# Patient Record
Sex: FEMALE | Race: ASIAN | Employment: UNEMPLOYED | URBAN - METROPOLITAN AREA
[De-identification: names, ages, dates, MRNs, and addresses within clinical notes are randomized per-mention and may not be internally consistent; named-entity substitution may affect disease eponyms.]

---

## 2019-01-01 ENCOUNTER — OFFICE VISIT (OUTPATIENT)
Dept: FAMILY MEDICINE CLINIC | Facility: CLINIC | Age: 29
End: 2019-01-01

## 2019-01-01 VITALS
HEIGHT: 66 IN | TEMPERATURE: 98 F | SYSTOLIC BLOOD PRESSURE: 110 MMHG | HEART RATE: 100 BPM | OXYGEN SATURATION: 99 % | DIASTOLIC BLOOD PRESSURE: 80 MMHG | WEIGHT: 117 LBS | RESPIRATION RATE: 16 BRPM | BODY MASS INDEX: 18.8 KG/M2

## 2019-01-01 DIAGNOSIS — R68.89 FLU-LIKE SYMPTOMS: Primary | ICD-10-CM

## 2019-01-01 PROCEDURE — 99203 OFFICE O/P NEW LOW 30 MIN: CPT | Performed by: NURSE PRACTITIONER

## 2019-01-01 RX ORDER — OSELTAMIVIR PHOSPHATE 75 MG/1
75 CAPSULE ORAL 2 TIMES DAILY
Qty: 10 CAPSULE | Refills: 0 | Status: SHIPPED | OUTPATIENT
Start: 2019-01-01

## 2019-01-01 NOTE — PROGRESS NOTES
CHIEF COMPLAINT:   Patient presents with:  Sinus Problem    HPI:   Kalie Reid is a 25year old female who presents with upper respiratory symptoms for  2  days.  Patient reports congestion, fever with Tmax to 103 F last night, dry cough, fatigue, malaise, margarita NOSE: Nasal passages erythematous and swollen with clear drainage  THROAT: oral mucosa pink and moist; posterior pharynx pink; tonsils 0+; no exudate   NECK: supple, non-tender  LUNGS: clear to auscultation bilaterally, no wheezes or rhonchi.  Breathing is Follow these guidelines when caring for yourself at home:  · Avoid being around cigarette smoke, whether yours or other people’s. · Acetaminophen or ibuprofen will help ease your fever, muscle aches, and headache.  Don’t give aspirin to anyone younger than © 7399-9002 [employer] [address] [facility] [place], [facility] [place]. All rights reserved. This information is not intended as a substitute for professional medical care. Always follow your healthcare professional's instructions.         Fred Maddox · Stay home until your fever has been gone for at least 24 hours without using medicine to reduce fever. Follow-up care  Follow up with your healthcare provider, or as advised, if you are not getting better over the next week.   If you are age 72 or older,

## 2019-01-01 NOTE — PATIENT INSTRUCTIONS
Influenza (Adult)    Influenza is also called the flu. It is a viral illness that affects the air passages of your lungs. It is different from the common cold. The flu can easily be passed from one to person to another.  It may be spread through the air b If you are age 72 or older, talk with your provider about getting a pneumococcal vaccine every 5 years. You should also get this vaccine if you have chronic asthma or COPD. All adults should get a flu vaccine every fall. Ask your provider about this.   When Follow these guidelines when caring for yourself at home:  · Avoid being around cigarette smoke, whether yours or other people’s. · Acetaminophen or ibuprofen will help ease your fever, muscle aches, and headache.  Don’t give aspirin to anyone younger than © 6496-3278 [employer] [address]. [address] [facility] [place], [facility] [place]. All rights reserved. This information is not intended as a substitute for professional medical care. Always follow your healthcare professional's instructions.

## 2019-09-01 ENCOUNTER — HOSPITAL (OUTPATIENT)
Dept: OTHER | Age: 29
End: 2019-09-01

## 2019-09-01 LAB
ALBUMIN SERPL-MCNC: 3.3 G/DL (ref 3.6–5.1)
ALBUMIN/GLOB SERPL: 0.9 {RATIO} (ref 1–2.4)
ALP SERPL-CCNC: 42 UNITS/L (ref 45–117)
ALP SERPL-CCNC: ABNORMAL U/L
ALT SERPL-CCNC: 27 UNITS/L
ANALYZER ANC (IANC): ABNORMAL
ANION GAP SERPL CALC-SCNC: 12 MMOL/L (ref 10–20)
AST SERPL-CCNC: 22 UNITS/L
BASOPHILS # BLD: 0 K/MCL (ref 0–0.3)
BASOPHILS NFR BLD: 0 %
BILIRUB SERPL-MCNC: 0.5 MG/DL (ref 0.2–1)
BUN SERPL-MCNC: 9 MG/DL (ref 6–20)
BUN/CREAT SERPL: 16 (ref 7–25)
CALCIUM SERPL-MCNC: 8.5 MG/DL (ref 8.4–10.2)
CHLORIDE SERPL-SCNC: 106 MMOL/L (ref 98–107)
CO2 SERPL-SCNC: 23 MMOL/L (ref 21–32)
CREAT SERPL-MCNC: 0.56 MG/DL (ref 0.51–0.95)
DIFFERENTIAL METHOD BLD: ABNORMAL
EOSINOPHIL # BLD: 0.1 K/MCL (ref 0.1–0.5)
EOSINOPHIL NFR BLD: 1 %
ERYTHROCYTE [DISTWIDTH] IN BLOOD: 12.9 % (ref 11–15)
GLOBULIN SER-MCNC: 3.8 G/DL (ref 2–4)
GLUCOSE BLDC GLUCOMTR-MCNC: 100 MG/DL (ref 65–99)
GLUCOSE SERPL-MCNC: 97 MG/DL (ref 65–99)
HCT VFR BLD CALC: 35 % (ref 36–46.5)
HGB BLD-MCNC: 12 G/DL (ref 12–15.5)
IMM GRANULOCYTES # BLD AUTO: 0 K/MCL (ref 0–0.2)
IMM GRANULOCYTES NFR BLD: 0 %
LIPASE SERPL-CCNC: 63 UNITS/L (ref 73–393)
LYMPHOCYTES # BLD: 0.7 K/MCL (ref 1–4.8)
LYMPHOCYTES NFR BLD: 10 %
MAGNESIUM SERPL-MCNC: 1.9 MG/DL (ref 1.7–2.4)
MCH RBC QN AUTO: 31 PG (ref 26–34)
MCHC RBC AUTO-ENTMCNC: 34.3 G/DL (ref 32–36.5)
MCV RBC AUTO: 90.4 FL (ref 78–100)
MONOCYTES # BLD: 0.4 K/MCL (ref 0.3–0.9)
MONOCYTES NFR BLD: 6 %
NEUTROPHILS # BLD: 6 K/MCL (ref 1.8–7.7)
NEUTROPHILS NFR BLD: 83 %
NEUTS SEG NFR BLD: ABNORMAL %
NRBC (NRBCRE): 0 /100 WBC
PLATELET # BLD: 234 K/MCL (ref 140–450)
POTASSIUM SERPL-SCNC: 3.5 MMOL/L (ref 3.4–5.1)
PROT SERPL-MCNC: 7.1 G/DL (ref 6.4–8.2)
RBC # BLD: 3.87 MIL/MCL (ref 4–5.2)
SODIUM SERPL-SCNC: 138 MMOL/L (ref 135–145)
WBC # BLD: 7.2 K/MCL (ref 4.2–11)

## 2020-02-01 ENCOUNTER — HOSPITAL (OUTPATIENT)
Dept: OTHER | Age: 30
End: 2020-02-01
Attending: OBSTETRICS & GYNECOLOGY

## 2020-02-01 LAB
ANALYZER ANC (IANC): NORMAL
BASOPHILS # BLD: 0 K/MCL (ref 0–0.3)
BASOPHILS NFR BLD: 0 %
DIFFERENTIAL METHOD BLD: NORMAL
EOSINOPHIL # BLD: 0.1 K/MCL (ref 0.1–0.5)
EOSINOPHIL NFR BLD: 1 %
ERYTHROCYTE [DISTWIDTH] IN BLOOD: 12.8 % (ref 11–15)
HCT VFR BLD CALC: 38.3 % (ref 36–46.5)
HGB BLD-MCNC: 12.7 G/DL (ref 12–15.5)
HIV1 AB SERPL QL IA: NONREACTIVE
HIV1 AB SERPL QL IA: NORMAL
IMM GRANULOCYTES # BLD AUTO: 0 K/MCL (ref 0–0.2)
IMM GRANULOCYTES NFR BLD: 0 %
LYMPHOCYTES # BLD: 2.5 K/MCL (ref 1–4.8)
LYMPHOCYTES NFR BLD: 29 %
MCH RBC QN AUTO: 31.5 PG (ref 26–34)
MCHC RBC AUTO-ENTMCNC: 33.2 G/DL (ref 32–36.5)
MCV RBC AUTO: 95 FL (ref 78–100)
MONOCYTES # BLD: 0.7 K/MCL (ref 0.3–0.9)
MONOCYTES NFR BLD: 9 %
NEUTROPHILS # BLD: 5.2 K/MCL (ref 1.8–7.7)
NEUTROPHILS NFR BLD: 61 %
NEUTS SEG NFR BLD: NORMAL %
NRBC (NRBCRE): 0 /100 WBC
PLATELET # BLD: 201 K/MCL (ref 140–450)
RBC # BLD: 4.03 MIL/MCL (ref 4–5.2)
WBC # BLD: 8.5 K/MCL (ref 4.2–11)

## 2020-02-02 LAB
ANALYZER ANC (IANC): ABNORMAL
ERYTHROCYTE [DISTWIDTH] IN BLOOD: 13 % (ref 11–15)
HCT VFR BLD CALC: 36.1 % (ref 36–46.5)
HGB BLD-MCNC: 11.8 G/DL (ref 12–15.5)
MCH RBC QN AUTO: 30.7 PG (ref 26–34)
MCHC RBC AUTO-ENTMCNC: 32.7 G/DL (ref 32–36.5)
MCV RBC AUTO: 94 FL (ref 78–100)
NRBC (NRBCRE): 0 /100 WBC
PLATELET # BLD: 170 K/MCL (ref 140–450)
RBC # BLD: 3.84 MIL/MCL (ref 4–5.2)
WBC # BLD: 11.7 K/MCL (ref 4.2–11)

## 2021-04-30 ENCOUNTER — IMMUNIZATION (OUTPATIENT)
Dept: LAB | Age: 31
End: 2021-04-30
Attending: HOSPITALIST
Payer: MEDICAID

## 2021-04-30 DIAGNOSIS — Z23 NEED FOR VACCINATION: Primary | ICD-10-CM

## 2021-04-30 PROCEDURE — 0001A SARSCOV2 VAC 30MCG/0.3ML IM: CPT

## 2021-05-21 ENCOUNTER — IMMUNIZATION (OUTPATIENT)
Dept: LAB | Age: 31
End: 2021-05-21
Attending: HOSPITALIST
Payer: MEDICAID

## 2021-05-21 DIAGNOSIS — Z23 NEED FOR VACCINATION: Primary | ICD-10-CM

## 2021-05-21 PROCEDURE — 0002A SARSCOV2 VAC 30MCG/0.3ML IM: CPT

## 2022-03-03 ENCOUNTER — HOSPITAL ENCOUNTER (EMERGENCY)
Age: 32
Discharge: HOME | End: 2022-03-03
Attending: EMERGENCY MEDICINE

## 2022-03-03 VITALS
DIASTOLIC BLOOD PRESSURE: 66 MMHG | TEMPERATURE: 97.7 F | OXYGEN SATURATION: 100 % | HEART RATE: 59 BPM | RESPIRATION RATE: 16 BRPM | SYSTOLIC BLOOD PRESSURE: 105 MMHG

## 2022-03-03 DIAGNOSIS — E86.0 MILD DEHYDRATION: ICD-10-CM

## 2022-03-03 DIAGNOSIS — O21.9 NAUSEA AND VOMITING IN PREGNANCY: Primary | ICD-10-CM

## 2022-03-03 PROBLEM — R51.9 HEADACHE: Status: ACTIVE | Noted: 2022-03-03

## 2022-03-03 PROBLEM — O21.0 HYPEREMESIS GRAVIDARUM: Status: ACTIVE | Noted: 2022-03-03

## 2022-03-03 PROBLEM — M19.90 ARTHRITIS: Status: ACTIVE | Noted: 2022-03-03

## 2022-03-03 LAB
ALBUMIN SERPL-MCNC: 4 G/DL (ref 3.6–5.1)
ALBUMIN/GLOB SERPL: 1.1 {RATIO} (ref 1–2.4)
ALP SERPL-CCNC: 42 UNITS/L (ref 45–117)
ALT SERPL-CCNC: 22 UNITS/L
ANION GAP SERPL CALC-SCNC: 8 MMOL/L (ref 10–20)
APPEARANCE UR: CLEAR
AST SERPL-CCNC: 14 UNITS/L
BACTERIA #/AREA URNS HPF: ABNORMAL /HPF
BASOPHILS # BLD: 0 K/MCL (ref 0–0.3)
BASOPHILS NFR BLD: 1 %
BILIRUB SERPL-MCNC: 0.3 MG/DL (ref 0.2–1)
BILIRUB UR QL STRIP: NEGATIVE
BUN SERPL-MCNC: 11 MG/DL (ref 6–20)
BUN/CREAT SERPL: 18 (ref 7–25)
CALCIUM SERPL-MCNC: 9.4 MG/DL (ref 8.4–10.2)
CHLORIDE SERPL-SCNC: 105 MMOL/L (ref 98–107)
CO2 SERPL-SCNC: 26 MMOL/L (ref 21–32)
COLOR UR: YELLOW
CREAT SERPL-MCNC: 0.6 MG/DL (ref 0.51–0.95)
DEPRECATED RDW RBC: 44.1 FL (ref 39–50)
EOSINOPHIL # BLD: 0.1 K/MCL (ref 0–0.5)
EOSINOPHIL NFR BLD: 1 %
ERYTHROCYTE [DISTWIDTH] IN BLOOD: 12.8 % (ref 11–15)
FASTING DURATION TIME PATIENT: ABNORMAL H
GFR SERPLBLD BASED ON 1.73 SQ M-ARVRAT: >90 ML/MIN
GLOBULIN SER-MCNC: 3.6 G/DL (ref 2–4)
GLUCOSE SERPL-MCNC: 86 MG/DL (ref 70–99)
GLUCOSE UR STRIP-MCNC: NEGATIVE MG/DL
HCG SERPL-ACNC: ABNORMAL MUNITS/ML
HCT VFR BLD CALC: 38 % (ref 36–46.5)
HGB BLD-MCNC: 12.5 G/DL (ref 12–15.5)
HGB UR QL STRIP: ABNORMAL
HYALINE CASTS #/AREA URNS LPF: ABNORMAL /LPF
IMM GRANULOCYTES # BLD AUTO: 0 K/MCL (ref 0–0.2)
IMM GRANULOCYTES # BLD: 0 %
KETONES UR STRIP-MCNC: ABNORMAL MG/DL
LEUKOCYTE ESTERASE UR QL STRIP: NEGATIVE
LIPASE SERPL-CCNC: 69 UNITS/L (ref 73–393)
LYMPHOCYTES # BLD: 2 K/MCL (ref 1–4.8)
LYMPHOCYTES NFR BLD: 29 %
MAGNESIUM SERPL-MCNC: 2.1 MG/DL (ref 1.7–2.4)
MCH RBC QN AUTO: 30.8 PG (ref 26–34)
MCHC RBC AUTO-ENTMCNC: 32.9 G/DL (ref 32–36.5)
MCV RBC AUTO: 93.6 FL (ref 78–100)
MONOCYTES # BLD: 0.5 K/MCL (ref 0.3–0.9)
MONOCYTES NFR BLD: 7 %
MUCOUS THREADS URNS QL MICRO: PRESENT
NEUTROPHILS # BLD: 4.2 K/MCL (ref 1.8–7.7)
NEUTROPHILS NFR BLD: 62 %
NITRITE UR QL STRIP: NEGATIVE
NRBC BLD MANUAL-RTO: 0 /100 WBC
PH UR STRIP: 7 [PH] (ref 5–7)
PLATELET # BLD AUTO: 289 K/MCL (ref 140–450)
POTASSIUM SERPL-SCNC: 3.8 MMOL/L (ref 3.4–5.1)
PROT SERPL-MCNC: 7.6 G/DL (ref 6.4–8.2)
PROT UR STRIP-MCNC: NEGATIVE MG/DL
RAINBOW EXTRA TUBES HOLD SPECIMEN: NORMAL
RBC # BLD: 4.06 MIL/MCL (ref 4–5.2)
RBC #/AREA URNS HPF: ABNORMAL /HPF
SODIUM SERPL-SCNC: 135 MMOL/L (ref 135–145)
SP GR UR STRIP: 1.01 (ref 1–1.03)
SQUAMOUS #/AREA URNS HPF: ABNORMAL /HPF
UROBILINOGEN UR STRIP-MCNC: 0.2 MG/DL
WBC # BLD: 6.8 K/MCL (ref 4.2–11)
WBC #/AREA URNS HPF: ABNORMAL /HPF

## 2022-03-03 PROCEDURE — 99283 EMERGENCY DEPT VISIT LOW MDM: CPT

## 2022-03-03 PROCEDURE — 83735 ASSAY OF MAGNESIUM: CPT | Performed by: EMERGENCY MEDICINE

## 2022-03-03 PROCEDURE — 80053 COMPREHEN METABOLIC PANEL: CPT | Performed by: EMERGENCY MEDICINE

## 2022-03-03 PROCEDURE — 81001 URINALYSIS AUTO W/SCOPE: CPT | Performed by: EMERGENCY MEDICINE

## 2022-03-03 PROCEDURE — 84702 CHORIONIC GONADOTROPIN TEST: CPT | Performed by: EMERGENCY MEDICINE

## 2022-03-03 PROCEDURE — 83690 ASSAY OF LIPASE: CPT | Performed by: EMERGENCY MEDICINE

## 2022-03-03 PROCEDURE — 10002803 HB RX 637: Performed by: EMERGENCY MEDICINE

## 2022-03-03 PROCEDURE — 85025 COMPLETE CBC W/AUTO DIFF WBC: CPT | Performed by: EMERGENCY MEDICINE

## 2022-03-03 PROCEDURE — 36415 COLL VENOUS BLD VENIPUNCTURE: CPT

## 2022-03-03 PROCEDURE — 10002807 HB RX 258: Performed by: EMERGENCY MEDICINE

## 2022-03-03 RX ORDER — ONDANSETRON 4 MG/1
8 TABLET, ORALLY DISINTEGRATING ORAL ONCE
Status: COMPLETED | OUTPATIENT
Start: 2022-03-03 | End: 2022-03-03

## 2022-03-03 RX ORDER — METOCLOPRAMIDE HYDROCHLORIDE 5 MG/ML
10 INJECTION INTRAMUSCULAR; INTRAVENOUS ONCE
Status: DISCONTINUED | OUTPATIENT
Start: 2022-03-03 | End: 2022-03-03

## 2022-03-03 RX ORDER — DOXYLAMINE SUCCINATE AND PYRIDOXINE HYDROCHLORIDE, DELAYED RELEASE TABLETS 10 MG/10 MG 10; 10 MG/1; MG/1
TABLET, DELAYED RELEASE ORAL
Qty: 30 TABLET | Refills: 0 | Status: ON HOLD | OUTPATIENT
Start: 2022-03-03 | End: 2022-10-10 | Stop reason: HOSPADM

## 2022-03-03 RX ORDER — DIPHENHYDRAMINE HYDROCHLORIDE 50 MG/ML
50 INJECTION INTRAMUSCULAR; INTRAVENOUS ONCE
Status: DISCONTINUED | OUTPATIENT
Start: 2022-03-03 | End: 2022-03-03

## 2022-03-03 RX ORDER — CHLORPROMAZINE HYDROCHLORIDE 10 MG/1
10 TABLET, FILM COATED ORAL 3 TIMES DAILY
COMMUNITY
End: 2022-03-03

## 2022-03-03 RX ORDER — METOCLOPRAMIDE 10 MG/1
10 TABLET ORAL 3 TIMES DAILY
Status: ON HOLD | COMMUNITY
Start: 2022-02-25 | End: 2022-10-10 | Stop reason: HOSPADM

## 2022-03-03 RX ORDER — FOLIC ACID 1 MG/1
1000 TABLET ORAL DAILY
COMMUNITY
Start: 2022-02-11

## 2022-03-03 RX ADMIN — ONDANSETRON 8 MG: 4 TABLET, ORALLY DISINTEGRATING ORAL at 14:05

## 2022-03-03 RX ADMIN — SODIUM CHLORIDE, POTASSIUM CHLORIDE, SODIUM LACTATE AND CALCIUM CHLORIDE 1000 ML: 600; 310; 30; 20 INJECTION, SOLUTION INTRAVENOUS at 14:09

## 2022-03-03 RX ADMIN — SODIUM CHLORIDE 1000 ML: 9 INJECTION, SOLUTION INTRAVENOUS at 14:08

## 2022-03-03 ASSESSMENT — ENCOUNTER SYMPTOMS
ALLERGIC/IMMUNOLOGIC NEGATIVE: 1
FATIGUE: 1
VOMITING: 1
EYES NEGATIVE: 1
PSYCHIATRIC NEGATIVE: 1
FEVER: 0
HEMATOLOGIC/LYMPHATIC NEGATIVE: 1
ENDOCRINE NEGATIVE: 1
ACTIVITY CHANGE: 1
APPETITE CHANGE: 1
DIARRHEA: 1
NAUSEA: 1
RESPIRATORY NEGATIVE: 1
UNEXPECTED WEIGHT CHANGE: 0
NEUROLOGICAL NEGATIVE: 1

## 2022-03-03 ASSESSMENT — PAIN SCALES - GENERAL: PAINLEVEL_OUTOF10: 3

## 2022-03-25 ENCOUNTER — HOSPITAL ENCOUNTER (OUTPATIENT)
Dept: LAB | Age: 32
Discharge: HOME | End: 2022-03-25
Attending: OBSTETRICS & GYNECOLOGY

## 2022-03-25 DIAGNOSIS — Z34.81 ENCOUNTER FOR SUPERVISION OF OTHER NORMAL PREGNANCY, FIRST TRIMESTER: Primary | ICD-10-CM

## 2022-03-25 LAB
BASOPHILS # BLD: 0 K/MCL (ref 0–0.3)
BASOPHILS NFR BLD: 1 %
BKR KIT TESTING DISCLAIMER STATEMENT: NORMAL
DEPRECATED RDW RBC: 42.2 FL (ref 39–50)
EOSINOPHIL # BLD: 0.1 K/MCL (ref 0–0.5)
EOSINOPHIL NFR BLD: 1 %
ERYTHROCYTE [DISTWIDTH] IN BLOOD: 12.6 % (ref 11–15)
HBV SURFACE AG SER QL: NEGATIVE
HCT VFR BLD CALC: 35.5 % (ref 36–46.5)
HGB BLD-MCNC: 12 G/DL (ref 12–15.5)
HIV 1+2 AB+HIV1 P24 AG SERPL QL IA: NONREACTIVE
IMM GRANULOCYTES # BLD AUTO: 0 K/MCL (ref 0–0.2)
IMM GRANULOCYTES # BLD: 0 %
LYMPHOCYTES # BLD: 2.1 K/MCL (ref 1–4.8)
LYMPHOCYTES NFR BLD: 28 %
MCH RBC QN AUTO: 30.5 PG (ref 26–34)
MCHC RBC AUTO-ENTMCNC: 33.8 G/DL (ref 32–36.5)
MCV RBC AUTO: 90.3 FL (ref 78–100)
MONOCYTES # BLD: 0.5 K/MCL (ref 0.3–0.9)
MONOCYTES NFR BLD: 7 %
NEUTROPHILS # BLD: 5 K/MCL (ref 1.8–7.7)
NEUTROPHILS NFR BLD: 63 %
NRBC BLD MANUAL-RTO: 0 /100 WBC
PLATELET # BLD AUTO: 252 K/MCL (ref 140–450)
RBC # BLD: 3.93 MIL/MCL (ref 4–5.2)
RUBV IGG SERPL IA-ACNC: 150.5 UNITS/ML
WBC # BLD: 7.7 K/MCL (ref 4.2–11)

## 2022-03-25 PROCEDURE — 36415 COLL VENOUS BLD VENIPUNCTURE: CPT

## 2022-03-25 PROCEDURE — 80081 OBSTETRIC PANEL INC HIV TSTG: CPT | Performed by: CLINICAL MEDICAL LABORATORY

## 2022-03-25 PROCEDURE — 86787 VARICELLA-ZOSTER ANTIBODY: CPT | Performed by: CLINICAL MEDICAL LABORATORY

## 2022-03-26 LAB
ABO + RH BLD: NORMAL
BLD GP AB SCN SERPL QL GEL: NEGATIVE
RPR SER QL: NONREACTIVE

## 2022-03-28 LAB — VZV IGG SER IA-ACNC: NORMAL

## 2022-05-18 ENCOUNTER — CLINICAL ABSTRACT (OUTPATIENT)
Dept: MATERNAL FETAL MEDICINE | Age: 32
End: 2022-05-18

## 2022-05-18 DIAGNOSIS — Z13.89 ENCOUNTER FOR ROUTINE SCREENING FOR MALFORMATION USING ULTRASONICS: Primary | ICD-10-CM

## 2022-05-24 ENCOUNTER — TELEPHONE (OUTPATIENT)
Dept: MATERNAL FETAL MEDICINE | Age: 32
End: 2022-05-24

## 2022-05-24 DIAGNOSIS — Z36.3 ENCOUNTER FOR ANTENATAL SCREENING FOR MALFORMATION: Primary | ICD-10-CM

## 2022-06-29 ENCOUNTER — CLINICAL ABSTRACT (OUTPATIENT)
Dept: MATERNAL FETAL MEDICINE | Age: 32
End: 2022-06-29

## 2022-07-06 ENCOUNTER — APPOINTMENT (OUTPATIENT)
Dept: MATERNAL FETAL MEDICINE | Age: 32
End: 2022-07-06
Attending: OBSTETRICS & GYNECOLOGY

## 2022-08-09 ENCOUNTER — TELEPHONE (OUTPATIENT)
Dept: MATERNAL FETAL MEDICINE | Age: 32
End: 2022-08-09

## 2022-08-09 ENCOUNTER — HOSPITAL ENCOUNTER (OUTPATIENT)
Dept: LAB | Age: 32
Discharge: HOME | End: 2022-08-09
Attending: OBSTETRICS & GYNECOLOGY

## 2022-08-09 DIAGNOSIS — Z36.3 ENCOUNTER FOR ANTENATAL SCREENING FOR MALFORMATION: Primary | ICD-10-CM

## 2022-08-09 DIAGNOSIS — Z34.82 ENCOUNTER FOR SUPERVISION OF OTHER NORMAL PREGNANCY, SECOND TRIMESTER: Primary | ICD-10-CM

## 2022-08-09 LAB
BASOPHILS # BLD: 0 K/MCL (ref 0–0.3)
BASOPHILS NFR BLD: 0 %
DEPRECATED RDW RBC: 47 FL (ref 39–50)
EOSINOPHIL # BLD: 0.1 K/MCL (ref 0–0.5)
EOSINOPHIL NFR BLD: 1 %
ERYTHROCYTE [DISTWIDTH] IN BLOOD: 13.4 % (ref 11–15)
GLUCOSE 1H P 50 G GLC PO SERPL-MCNC: 95 MG/DL (ref 65–139)
HCT VFR BLD CALC: 35.7 % (ref 36–46.5)
HGB BLD-MCNC: 11.8 G/DL (ref 12–15.5)
HIV 1+2 AB+HIV1 P24 AG SERPL QL IA: NONREACTIVE
IMM GRANULOCYTES # BLD AUTO: 0.1 K/MCL (ref 0–0.2)
IMM GRANULOCYTES # BLD: 1 %
LYMPHOCYTES # BLD: 1.9 K/MCL (ref 1–4.8)
LYMPHOCYTES NFR BLD: 26 %
MCH RBC QN AUTO: 31.6 PG (ref 26–34)
MCHC RBC AUTO-ENTMCNC: 33.1 G/DL (ref 32–36.5)
MCV RBC AUTO: 95.7 FL (ref 78–100)
MONOCYTES # BLD: 0.4 K/MCL (ref 0.3–0.9)
MONOCYTES NFR BLD: 6 %
NEUTROPHILS # BLD: 4.8 K/MCL (ref 1.8–7.7)
NEUTROPHILS NFR BLD: 66 %
NRBC BLD MANUAL-RTO: 0 /100 WBC
PLATELET # BLD AUTO: 199 K/MCL (ref 140–450)
RBC # BLD: 3.73 MIL/MCL (ref 4–5.2)
WBC # BLD: 7.2 K/MCL (ref 4.2–11)

## 2022-08-09 PROCEDURE — 36415 COLL VENOUS BLD VENIPUNCTURE: CPT | Performed by: CLINICAL MEDICAL LABORATORY

## 2022-08-09 PROCEDURE — 86592 SYPHILIS TEST NON-TREP QUAL: CPT | Performed by: CLINICAL MEDICAL LABORATORY

## 2022-08-09 PROCEDURE — 87389 HIV-1 AG W/HIV-1&-2 AB AG IA: CPT | Performed by: CLINICAL MEDICAL LABORATORY

## 2022-08-09 PROCEDURE — 82950 GLUCOSE TEST: CPT | Performed by: CLINICAL MEDICAL LABORATORY

## 2022-08-09 PROCEDURE — 85025 COMPLETE CBC W/AUTO DIFF WBC: CPT | Performed by: CLINICAL MEDICAL LABORATORY

## 2022-08-10 LAB — RPR SER QL: NONREACTIVE

## 2022-08-25 ENCOUNTER — TELEPHONE (OUTPATIENT)
Dept: MATERNAL FETAL MEDICINE | Age: 32
End: 2022-08-25

## 2022-08-30 ENCOUNTER — APPOINTMENT (OUTPATIENT)
Dept: MATERNAL FETAL MEDICINE | Age: 32
End: 2022-08-30
Attending: OBSTETRICS & GYNECOLOGY

## 2022-08-31 ENCOUNTER — TELEPHONE (OUTPATIENT)
Dept: MATERNAL FETAL MEDICINE | Age: 32
End: 2022-08-31

## 2022-09-17 LAB — GBS EXTERNAL: NEGATIVE

## 2022-10-09 ENCOUNTER — ANESTHESIA (OUTPATIENT)
Dept: OBGYN | Age: 32
DRG: 560 | End: 2022-10-09

## 2022-10-09 ENCOUNTER — HOSPITAL ENCOUNTER (INPATIENT)
Age: 32
LOS: 1 days | Discharge: HOME | DRG: 560 | End: 2022-10-10
Attending: OBSTETRICS & GYNECOLOGY | Admitting: OBSTETRICS & GYNECOLOGY

## 2022-10-09 ENCOUNTER — ANESTHESIA EVENT (OUTPATIENT)
Dept: OBGYN | Age: 32
DRG: 560 | End: 2022-10-09

## 2022-10-09 LAB
ABO + RH BLD: NORMAL
ABO + RH BLD: NORMAL
BLD GP AB SCN SERPL QL GEL: NEGATIVE
BLOOD EXPIRATION DATE: NORMAL
BLOOD EXPIRATION DATE: NORMAL
CROSSMATCH RESULT: NORMAL
CROSSMATCH RESULT: NORMAL
DEPRECATED RDW RBC: 46.5 FL (ref 39–50)
DISPENSE STATUS: NORMAL
DISPENSE STATUS: NORMAL
ERYTHROCYTE [DISTWIDTH] IN BLOOD: 13.7 % (ref 11–15)
HCT VFR BLD CALC: 35.7 % (ref 36–46.5)
HGB BLD-MCNC: 11.9 G/DL (ref 12–15.5)
ISBT BLOOD TYPE: 6200
ISBT BLOOD TYPE: 6200
MCH RBC QN AUTO: 31.4 PG (ref 26–34)
MCHC RBC AUTO-ENTMCNC: 33.3 G/DL (ref 32–36.5)
MCV RBC AUTO: 94.2 FL (ref 78–100)
NRBC BLD MANUAL-RTO: 0 /100 WBC
PLATELET # BLD AUTO: 202 K/MCL (ref 140–450)
PRODUCT CODE: NORMAL
PRODUCT CODE: NORMAL
PRODUCT DESCRIPTION: NORMAL
PRODUCT DESCRIPTION: NORMAL
PRODUCT ID: NORMAL
PRODUCT ID: NORMAL
RBC # BLD: 3.79 MIL/MCL (ref 4–5.2)
TRANSFUSION SERVICES SAMPLE TRANSFER: NORMAL
TYPE AND SCREEN EXPIRATION DATE: NORMAL
UNIT BLOOD TYPE: NORMAL
UNIT BLOOD TYPE: NORMAL
UNIT NUMBER: NORMAL
UNIT NUMBER: NORMAL
WBC # BLD: 6.9 K/MCL (ref 4.2–11)

## 2022-10-09 PROCEDURE — 10004651 HB RX, NO CHARGE ITEM: Performed by: OBSTETRICS & GYNECOLOGY

## 2022-10-09 PROCEDURE — 13000001 HB PHASE II RECOVERY EA 30 MINUTES

## 2022-10-09 PROCEDURE — 10002800 HB RX 250 W HCPCS: Performed by: OBSTETRICS & GYNECOLOGY

## 2022-10-09 PROCEDURE — 10001788 HB DELIVERY VAGINAL

## 2022-10-09 PROCEDURE — 3E033VJ INTRODUCTION OF OTHER HORMONE INTO PERIPHERAL VEIN, PERCUTANEOUS APPROACH: ICD-10-PCS | Performed by: OBSTETRICS & GYNECOLOGY

## 2022-10-09 PROCEDURE — 0KQM0ZZ REPAIR PERINEUM MUSCLE, OPEN APPROACH: ICD-10-PCS | Performed by: OBSTETRICS & GYNECOLOGY

## 2022-10-09 PROCEDURE — 85027 COMPLETE CBC AUTOMATED: CPT | Performed by: OBSTETRICS & GYNECOLOGY

## 2022-10-09 PROCEDURE — 10002803 HB RX 637: Performed by: OBSTETRICS & GYNECOLOGY

## 2022-10-09 PROCEDURE — 10002801 HB RX 250 W/O HCPCS: Performed by: ANESTHESIOLOGY

## 2022-10-09 PROCEDURE — 13001456 HB PERINATAL CARE

## 2022-10-09 PROCEDURE — 10002807 HB RX 258: Performed by: OBSTETRICS & GYNECOLOGY

## 2022-10-09 PROCEDURE — 86900 BLOOD TYPING SEROLOGIC ABO: CPT | Performed by: OBSTETRICS & GYNECOLOGY

## 2022-10-09 PROCEDURE — 86920 COMPATIBILITY TEST SPIN: CPT

## 2022-10-09 PROCEDURE — 13000012 HB ANESTHESIA SPINAL/EPIDURAL IN L&D

## 2022-10-09 PROCEDURE — 10000003 HB ROOM CHARGE WOMEN'S HEALTH

## 2022-10-09 RX ORDER — HYDROCORTISONE ACETATE PRAMOXINE HCL 2.5; 1 G/100G; G/100G
CREAM TOPICAL 3 TIMES DAILY PRN
Status: DISCONTINUED | OUTPATIENT
Start: 2022-10-09 | End: 2022-10-11 | Stop reason: HOSPADM

## 2022-10-09 RX ORDER — OXYTOCIN 10 [USP'U]/ML
10 INJECTION, SOLUTION INTRAMUSCULAR; INTRAVENOUS
Status: DISCONTINUED | OUTPATIENT
Start: 2022-10-09 | End: 2022-10-09

## 2022-10-09 RX ORDER — IBUPROFEN 600 MG/1
600 TABLET ORAL EVERY 6 HOURS
Status: DISCONTINUED | OUTPATIENT
Start: 2022-10-09 | End: 2022-10-11 | Stop reason: HOSPADM

## 2022-10-09 RX ORDER — OXYTOCIN 10 [USP'U]/ML
10 INJECTION, SOLUTION INTRAMUSCULAR; INTRAVENOUS
Status: DISCONTINUED | OUTPATIENT
Start: 2022-10-09 | End: 2022-10-11 | Stop reason: HOSPADM

## 2022-10-09 RX ORDER — EPHEDRINE SULFATE/0.9% NACL/PF 50 MG/10ML
SYRINGE (ML) INTRAVENOUS
Status: DISCONTINUED
Start: 2022-10-09 | End: 2022-10-09 | Stop reason: WASHOUT

## 2022-10-09 RX ORDER — NALBUPHINE HYDROCHLORIDE 10 MG/ML
2.5 INJECTION, SOLUTION INTRAMUSCULAR; INTRAVENOUS; SUBCUTANEOUS
Status: DISCONTINUED | OUTPATIENT
Start: 2022-10-09 | End: 2022-10-09

## 2022-10-09 RX ORDER — LIDOCAINE HYDROCHLORIDE AND EPINEPHRINE 15; 5 MG/ML; UG/ML
INJECTION, SOLUTION EPIDURAL PRN
Status: DISCONTINUED | OUTPATIENT
Start: 2022-10-09 | End: 2022-10-10

## 2022-10-09 RX ORDER — BUPIVACAINE HYDROCHLORIDE 2.5 MG/ML
10 INJECTION, SOLUTION EPIDURAL; INFILTRATION; INTRACAUDAL ONCE
Status: COMPLETED | OUTPATIENT
Start: 2022-10-09 | End: 2022-10-09

## 2022-10-09 RX ORDER — NALOXONE HCL 0.4 MG/ML
0.1 VIAL (ML) INJECTION PRN
Status: DISCONTINUED | OUTPATIENT
Start: 2022-10-09 | End: 2022-10-09

## 2022-10-09 RX ORDER — LIDOCAINE HYDROCHLORIDE 10 MG/ML
30 INJECTION, SOLUTION EPIDURAL; INFILTRATION; INTRACAUDAL; PERINEURAL
Status: DISCONTINUED | OUTPATIENT
Start: 2022-10-09 | End: 2022-10-09

## 2022-10-09 RX ORDER — VITAMIN A, VITAMIN C, VITAMIN D-3, VITAMIN E, VITAMIN B-1, VITAMIN B-2, NIACIN, VITAMIN B-6, CALCIUM, IRON, ZINC, COPPER 4000; 120; 400; 22; 1.84; 3; 20; 10; 1; 12; 200; 27; 25; 2 [IU]/1; MG/1; [IU]/1; MG/1; MG/1; MG/1; MG/1; MG/1; MG/1; UG/1; MG/1; MG/1; MG/1; MG/1
1 TABLET ORAL DAILY
Status: DISCONTINUED | OUTPATIENT
Start: 2022-10-09 | End: 2022-10-11 | Stop reason: HOSPADM

## 2022-10-09 RX ORDER — ACETAMINOPHEN 325 MG/1
650 TABLET ORAL EVERY 6 HOURS
Status: DISCONTINUED | OUTPATIENT
Start: 2022-10-09 | End: 2022-10-11 | Stop reason: HOSPADM

## 2022-10-09 RX ORDER — SIMETHICONE 125 MG
125 TABLET,CHEWABLE ORAL EVERY 4 HOURS PRN
Status: DISCONTINUED | OUTPATIENT
Start: 2022-10-09 | End: 2022-10-11 | Stop reason: HOSPADM

## 2022-10-09 RX ORDER — OXYTOCIN-SODIUM CHLORIDE 0.9% IV SOLN 30 UNIT/500ML 30-0.9/5 UT/ML-%
0-334 SOLUTION INTRAVENOUS CONTINUOUS
Status: DISCONTINUED | OUTPATIENT
Start: 2022-10-09 | End: 2022-10-09

## 2022-10-09 RX ORDER — HEPARIN SOD,PORK IN 0.45% NACL 25000/500
INTRAVENOUS SOLUTION INTRAVENOUS CONTINUOUS
Status: DISCONTINUED | OUTPATIENT
Start: 2022-10-09 | End: 2022-10-09

## 2022-10-09 RX ORDER — CALCIUM CARBONATE 500 MG/1
500 TABLET, CHEWABLE ORAL EVERY 4 HOURS PRN
Status: DISCONTINUED | OUTPATIENT
Start: 2022-10-09 | End: 2022-10-11 | Stop reason: HOSPADM

## 2022-10-09 RX ORDER — OXYTOCIN-SODIUM CHLORIDE 0.9% IV SOLN 30 UNIT/500ML 30-0.9/5 UT/ML-%
0-334 SOLUTION INTRAVENOUS CONTINUOUS
Status: DISCONTINUED | OUTPATIENT
Start: 2022-10-09 | End: 2022-10-11 | Stop reason: HOSPADM

## 2022-10-09 RX ORDER — EPHEDRINE SULFATE/0.9% NACL/PF 50 MG/10ML
10 SYRINGE (ML) INTRAVENOUS
Status: DISCONTINUED | OUTPATIENT
Start: 2022-10-09 | End: 2022-10-09

## 2022-10-09 RX ORDER — ONDANSETRON 2 MG/ML
4 INJECTION INTRAMUSCULAR; INTRAVENOUS EVERY 6 HOURS PRN
Status: DISCONTINUED | OUTPATIENT
Start: 2022-10-09 | End: 2022-10-09

## 2022-10-09 RX ORDER — 0.9 % SODIUM CHLORIDE 0.9 %
2 VIAL (ML) INJECTION EVERY 12 HOURS SCHEDULED
Status: DISCONTINUED | OUTPATIENT
Start: 2022-10-09 | End: 2022-10-09

## 2022-10-09 RX ORDER — SODIUM CHLORIDE, SODIUM LACTATE, POTASSIUM CHLORIDE, CALCIUM CHLORIDE 600; 310; 30; 20 MG/100ML; MG/100ML; MG/100ML; MG/100ML
INJECTION, SOLUTION INTRAVENOUS CONTINUOUS
Status: DISCONTINUED | OUTPATIENT
Start: 2022-10-09 | End: 2022-10-09

## 2022-10-09 RX ORDER — OXYTOCIN/0.9 % SODIUM CHLORIDE 30/500 ML
0-25 PLASTIC BAG, INJECTION (ML) INTRAVENOUS CONTINUOUS
Status: DISCONTINUED | OUTPATIENT
Start: 2022-10-09 | End: 2022-10-09

## 2022-10-09 RX ADMIN — Medication 10 ML/HR: at 14:20

## 2022-10-09 RX ADMIN — LIDOCAINE HYDROCHLORIDE,EPINEPHRINE BITARTRATE 3 ML: 15; .005 INJECTION, SOLUTION EPIDURAL; INFILTRATION; INTRACAUDAL; PERINEURAL at 14:13

## 2022-10-09 RX ADMIN — SODIUM CHLORIDE, POTASSIUM CHLORIDE, SODIUM LACTATE AND CALCIUM CHLORIDE 999 ML/HR: 600; 310; 30; 20 INJECTION, SOLUTION INTRAVENOUS at 08:55

## 2022-10-09 RX ADMIN — IBUPROFEN 600 MG: 600 TABLET ORAL at 22:58

## 2022-10-09 RX ADMIN — OXYTOCIN-SODIUM CHLORIDE 0.9% IV SOLN 30 UNIT/500ML 2 MILLI-UNITS/MIN: 30-0.9/5 SOLUTION at 09:22

## 2022-10-09 RX ADMIN — BUPIVACAINE HYDROCHLORIDE 5 ML: 2.5 INJECTION, SOLUTION EPIDURAL; INFILTRATION; INTRACAUDAL; PERINEURAL at 14:20

## 2022-10-09 RX ADMIN — SODIUM CHLORIDE, POTASSIUM CHLORIDE, SODIUM LACTATE AND CALCIUM CHLORIDE 999 ML/HR: 600; 310; 30; 20 INJECTION, SOLUTION INTRAVENOUS at 13:45

## 2022-10-09 RX ADMIN — ACETAMINOPHEN 650 MG: 325 TABLET ORAL at 17:48

## 2022-10-09 ASSESSMENT — PAIN SCALES - GENERAL
PAINLEVEL_OUTOF10: 0
PAINLEVEL_OUTOF10: 1
PAINLEVEL_OUTOF10: 0
PAINLEVEL_OUTOF10: 2
PAINLEVEL_OUTOF10: 2
PAINLEVEL_OUTOF10: 1
PAINLEVEL_OUTOF10: 3
PAINLEVEL_OUTOF10: 7
PAINLEVEL_OUTOF10: 1
PAINLEVEL_OUTOF10: 1

## 2022-10-09 ASSESSMENT — LIFESTYLE VARIABLES
HOW OFTEN DO YOU HAVE A DRINK CONTAINING ALCOHOL: NEVER
ARE YOU DEAF OR DO YOU HAVE SERIOUS DIFFICULTY  HEARING: NO
ADL NEEDS ASSIST: NO
RECENT DECLINE IN ADLS: NO
RECENTLY LOST WEIGHT WITHOUT TRYING: 0
HISTORY OF PROBLEMS WHEN YOU STOP DRINKING ALCOHOL: NO
ADL BEFORE ADMISSION: INDEPENDENT
HOW MANY STANDARD DRINKS CONTAINING ALCOHOL DO YOU HAVE ON A TYPICAL DAY: 0,1 OR 2
CHRONIC/CANCER PAIN PRESENT: NO
ALCOHOL_USE_STATUS: NO OR LOW RISK WITH VALIDATED TOOL
IS PATIENT ABLE TO COMPLETE ASSESSMENT AT THIS TIME: YES
ARE YOU BLIND OR DO YOU HAVE SERIOUS DIFFICULTY SEEING, EVEN WHEN WEARING GLASSES: NO
HOW OFTEN DO YOU HAVE 6 OR MORE DRINKS ON ONE OCCASION: NEVER
LAST DRINK YOU HAD: DENIES INTAKE
HAVE YOU BEEN EATING POORLY BECAUSE OF A DECREASED APPETITE: 0
AUDIT-C TOTAL SCORE: 0
SHORT OF BREATH OR FATIGUE WITH ADLS: NO

## 2022-10-09 ASSESSMENT — ACTIVITIES OF DAILY LIVING (ADL): ADL_SCORE: 12

## 2022-10-09 ASSESSMENT — ENCOUNTER SYMPTOMS
HEADACHES: 1
EXERCISE TOLERANCE: GOOD (>4 METS)

## 2022-10-09 ASSESSMENT — COGNITIVE AND FUNCTIONAL STATUS - GENERAL
BECAUSE OF A PHYSICAL, MENTAL, OR EMOTIONAL CONDITION, DO YOU HAVE SERIOUS DIFFICULTY CONCENTRATING, REMEMBERING OR MAKING DECISIONS: NO
DO YOU HAVE SERIOUS DIFFICULTY WALKING OR CLIMBING STAIRS: NO
DO YOU HAVE DIFFICULTY DRESSING OR BATHING: NO
BECAUSE OF A PHYSICAL, MENTAL, OR EMOTIONAL CONDITION, DO YOU HAVE DIFFICULTY DOING ERRANDS ALONE: NO

## 2022-10-10 VITALS
BODY MASS INDEX: 23.78 KG/M2 | OXYGEN SATURATION: 96 % | HEART RATE: 51 BPM | SYSTOLIC BLOOD PRESSURE: 94 MMHG | TEMPERATURE: 97.9 F | DIASTOLIC BLOOD PRESSURE: 59 MMHG | WEIGHT: 148 LBS | HEIGHT: 66 IN | RESPIRATION RATE: 16 BRPM

## 2022-10-10 LAB
HCT VFR BLD CALC: 35 % (ref 36–46.5)
HGB BLD-MCNC: 11.8 G/DL (ref 12–15.5)

## 2022-10-10 PROCEDURE — S9445 PT EDUCATION NOC INDIVID: HCPCS

## 2022-10-10 PROCEDURE — 10002803 HB RX 637: Performed by: OBSTETRICS & GYNECOLOGY

## 2022-10-10 PROCEDURE — 10004651 HB RX, NO CHARGE ITEM: Performed by: OBSTETRICS & GYNECOLOGY

## 2022-10-10 PROCEDURE — 85018 HEMOGLOBIN: CPT | Performed by: OBSTETRICS & GYNECOLOGY

## 2022-10-10 PROCEDURE — 36415 COLL VENOUS BLD VENIPUNCTURE: CPT | Performed by: OBSTETRICS & GYNECOLOGY

## 2022-10-10 RX ORDER — ACETAMINOPHEN 325 MG/1
650 TABLET ORAL EVERY 6 HOURS
Status: SHIPPED | COMMUNITY
Start: 2022-10-10

## 2022-10-10 RX ORDER — IBUPROFEN 600 MG/1
600 TABLET ORAL EVERY 6 HOURS
Status: SHIPPED | COMMUNITY
Start: 2022-10-10

## 2022-10-10 RX ADMIN — IBUPROFEN 600 MG: 600 TABLET ORAL at 08:16

## 2022-10-10 RX ADMIN — ACETAMINOPHEN 650 MG: 325 TABLET ORAL at 11:26

## 2022-10-10 RX ADMIN — ACETAMINOPHEN 650 MG: 325 TABLET ORAL at 03:18

## 2022-10-10 RX ADMIN — VITAMIN A, VITAMIN C, VITAMIN D, VITAMIN E, THIAMINE, RIBOFLAVIN, NIACIN, VITAMIN B6, FOLIC ACID, VITAMIN B12, CALCIUM, IRON, ZINC, COPPER 1 TABLET: 4000; 120; 400; 22; 1.84; 3; 20; 10; 1; 12; 200; 27; 25; 2 TABLET ORAL at 11:28

## 2022-10-10 ASSESSMENT — PAIN SCALES - GENERAL
PAINLEVEL_OUTOF10: 0
PAINLEVEL_OUTOF10: 5
PAINLEVEL_OUTOF10: 4
PAINLEVEL_OUTOF10: 2
PAINLEVEL_OUTOF10: 3
PAINLEVEL_OUTOF10: 3
PAINLEVEL_OUTOF10: 5

## 2022-12-10 ENCOUNTER — HOSPITAL ENCOUNTER (EMERGENCY)
Age: 32
Discharge: HOME | End: 2022-12-10
Attending: EMERGENCY MEDICINE
Payer: MEDICAID

## 2022-12-10 VITALS
WEIGHT: 125 LBS | RESPIRATION RATE: 18 BRPM | DIASTOLIC BLOOD PRESSURE: 71 MMHG | BODY MASS INDEX: 20.09 KG/M2 | SYSTOLIC BLOOD PRESSURE: 112 MMHG | TEMPERATURE: 99 F | HEART RATE: 82 BPM | HEIGHT: 66 IN | OXYGEN SATURATION: 97 %

## 2022-12-10 DIAGNOSIS — R11.2 NAUSEA AND VOMITING IN ADULT: Primary | ICD-10-CM

## 2022-12-10 LAB
ALBUMIN SERPL-MCNC: 4.3 G/DL (ref 3.4–5)
ALBUMIN/GLOB SERPL: 1.2 {RATIO} (ref 1–2)
ALP LIVER SERPL-CCNC: 75 U/L
ALT SERPL-CCNC: 31 U/L
ANION GAP SERPL CALC-SCNC: 5 MMOL/L (ref 0–18)
AST SERPL-CCNC: 16 U/L (ref 15–37)
BASOPHILS # BLD AUTO: 0.02 X10(3) UL (ref 0–0.2)
BASOPHILS NFR BLD AUTO: 0.3 %
BILIRUB SERPL-MCNC: 0.5 MG/DL (ref 0.1–2)
BUN BLD-MCNC: 19 MG/DL (ref 7–18)
CALCIUM BLD-MCNC: 9 MG/DL (ref 8.5–10.1)
CHLORIDE SERPL-SCNC: 106 MMOL/L (ref 98–112)
CO2 SERPL-SCNC: 29 MMOL/L (ref 21–32)
CREAT BLD-MCNC: 0.72 MG/DL
EOSINOPHIL # BLD AUTO: 0.16 X10(3) UL (ref 0–0.7)
EOSINOPHIL NFR BLD AUTO: 2.2 %
ERYTHROCYTE [DISTWIDTH] IN BLOOD BY AUTOMATED COUNT: 13.2 %
GFR SERPLBLD BASED ON 1.73 SQ M-ARVRAT: 113 ML/MIN/1.73M2 (ref 60–?)
GLOBULIN PLAS-MCNC: 3.6 G/DL (ref 2.8–4.4)
GLUCOSE BLD-MCNC: 98 MG/DL (ref 70–99)
HCT VFR BLD AUTO: 38.9 %
HGB BLD-MCNC: 13.2 G/DL
IMM GRANULOCYTES # BLD AUTO: 0.02 X10(3) UL (ref 0–1)
IMM GRANULOCYTES NFR BLD: 0.3 %
LYMPHOCYTES # BLD AUTO: 0.63 X10(3) UL (ref 1–4)
LYMPHOCYTES NFR BLD AUTO: 8.7 %
MCH RBC QN AUTO: 30.7 PG (ref 26–34)
MCHC RBC AUTO-ENTMCNC: 33.9 G/DL (ref 31–37)
MCV RBC AUTO: 90.5 FL
MONOCYTES # BLD AUTO: 0.42 X10(3) UL (ref 0.1–1)
MONOCYTES NFR BLD AUTO: 5.8 %
NEUTROPHILS # BLD AUTO: 6 X10 (3) UL (ref 1.5–7.7)
NEUTROPHILS # BLD AUTO: 6 X10(3) UL (ref 1.5–7.7)
NEUTROPHILS NFR BLD AUTO: 82.7 %
OSMOLALITY SERPL CALC.SUM OF ELEC: 292 MOSM/KG (ref 275–295)
PLATELET # BLD AUTO: 303 10(3)UL (ref 150–450)
POCT INFLUENZA A: NEGATIVE
POCT INFLUENZA B: NEGATIVE
POTASSIUM SERPL-SCNC: 3.7 MMOL/L (ref 3.5–5.1)
PROT SERPL-MCNC: 7.9 G/DL (ref 6.4–8.2)
RBC # BLD AUTO: 4.3 X10(6)UL
SODIUM SERPL-SCNC: 140 MMOL/L (ref 136–145)
WBC # BLD AUTO: 7.3 X10(3) UL (ref 4–11)

## 2022-12-10 PROCEDURE — 80053 COMPREHEN METABOLIC PANEL: CPT | Performed by: EMERGENCY MEDICINE

## 2022-12-10 PROCEDURE — 85025 COMPLETE CBC W/AUTO DIFF WBC: CPT | Performed by: EMERGENCY MEDICINE

## 2022-12-10 PROCEDURE — 96374 THER/PROPH/DIAG INJ IV PUSH: CPT | Performed by: EMERGENCY MEDICINE

## 2022-12-10 PROCEDURE — 87502 INFLUENZA DNA AMP PROBE: CPT | Performed by: EMERGENCY MEDICINE

## 2022-12-10 PROCEDURE — 99284 EMERGENCY DEPT VISIT MOD MDM: CPT | Performed by: EMERGENCY MEDICINE

## 2022-12-10 PROCEDURE — 96361 HYDRATE IV INFUSION ADD-ON: CPT | Performed by: EMERGENCY MEDICINE

## 2022-12-10 RX ORDER — ONDANSETRON 2 MG/ML
4 INJECTION INTRAMUSCULAR; INTRAVENOUS ONCE
Status: COMPLETED | OUTPATIENT
Start: 2022-12-10 | End: 2022-12-10

## 2022-12-10 RX ORDER — ONDANSETRON 4 MG/1
4 TABLET, ORALLY DISINTEGRATING ORAL EVERY 4 HOURS PRN
Qty: 10 TABLET | Refills: 0 | Status: SHIPPED | OUTPATIENT
Start: 2022-12-10 | End: 2022-12-17

## 2022-12-10 NOTE — DISCHARGE INSTRUCTIONS
Please follow-up with your primary care physician 1-2 days return to the ER if your symptoms worsen progress or if you have any further concerns. Drink lots fluids including Gatorade and clear broth with electrolytes.